# Patient Record
Sex: FEMALE | Race: WHITE | NOT HISPANIC OR LATINO | Employment: OTHER
[De-identification: names, ages, dates, MRNs, and addresses within clinical notes are randomized per-mention and may not be internally consistent; named-entity substitution may affect disease eponyms.]

---

## 2017-09-24 ENCOUNTER — HEALTH MAINTENANCE LETTER (OUTPATIENT)
Age: 62
End: 2017-09-24

## 2018-10-01 ENCOUNTER — HEALTH MAINTENANCE LETTER (OUTPATIENT)
Age: 63
End: 2018-10-01

## 2020-02-23 ENCOUNTER — HEALTH MAINTENANCE LETTER (OUTPATIENT)
Age: 65
End: 2020-02-23

## 2020-12-06 ENCOUNTER — HEALTH MAINTENANCE LETTER (OUTPATIENT)
Age: 65
End: 2020-12-06

## 2021-04-11 ENCOUNTER — HEALTH MAINTENANCE LETTER (OUTPATIENT)
Age: 66
End: 2021-04-11

## 2021-09-26 ENCOUNTER — HEALTH MAINTENANCE LETTER (OUTPATIENT)
Age: 66
End: 2021-09-26

## 2022-03-12 ENCOUNTER — HEALTH MAINTENANCE LETTER (OUTPATIENT)
Age: 67
End: 2022-03-12

## 2022-05-07 ENCOUNTER — HEALTH MAINTENANCE LETTER (OUTPATIENT)
Age: 67
End: 2022-05-07

## 2023-01-08 ENCOUNTER — HEALTH MAINTENANCE LETTER (OUTPATIENT)
Age: 68
End: 2023-01-08

## 2023-06-02 ENCOUNTER — HEALTH MAINTENANCE LETTER (OUTPATIENT)
Age: 68
End: 2023-06-02

## 2024-04-20 ENCOUNTER — THERAPY VISIT (OUTPATIENT)
Dept: PHYSICAL THERAPY | Facility: CLINIC | Age: 69
End: 2024-04-20
Payer: COMMERCIAL

## 2024-04-20 DIAGNOSIS — G89.29 CHRONIC PAIN OF BOTH KNEES: Primary | ICD-10-CM

## 2024-04-20 DIAGNOSIS — M25.561 CHRONIC PAIN OF BOTH KNEES: Primary | ICD-10-CM

## 2024-04-20 DIAGNOSIS — M25.562 CHRONIC PAIN OF BOTH KNEES: Primary | ICD-10-CM

## 2024-04-20 PROCEDURE — 97110 THERAPEUTIC EXERCISES: CPT | Mod: GP | Performed by: PHYSICAL THERAPIST

## 2024-04-20 PROCEDURE — 97161 PT EVAL LOW COMPLEX 20 MIN: CPT | Mod: GP | Performed by: PHYSICAL THERAPIST

## 2024-04-20 ASSESSMENT — ACTIVITIES OF DAILY LIVING (ADL)
PAIN: THE SYMPTOM AFFECTS MY ACTIVITY MODERATELY
GO DOWN STAIRS: ACTIVITY IS SOMEWHAT DIFFICULT
WALK: ACTIVITY IS NOT DIFFICULT
STAND: ACTIVITY IS NOT DIFFICULT
LIMPING: I DO NOT HAVE THE SYMPTOM
AS_A_RESULT_OF_YOUR_KNEE_INJURY,_HOW_WOULD_YOU_RATE_YOUR_CURRENT_LEVEL_OF_DAILY_ACTIVITY?: NEARLY NORMAL
RAW_SCORE: 50
WEAKNESS: I HAVE THE SYMPTOM BUT IT DOES NOT AFFECT MY ACTIVITY
GO UP STAIRS: ACTIVITY IS SOMEWHAT DIFFICULT
SIT WITH YOUR KNEE BENT: ACTIVITY IS SOMEWHAT DIFFICULT
KNEE_ACTIVITY_OF_DAILY_LIVING_SCORE: 71.43
RISE FROM A CHAIR: ACTIVITY IS SOMEWHAT DIFFICULT
SWELLING: I DO NOT HAVE THE SYMPTOM
HOW_WOULD_YOU_RATE_THE_CURRENT_FUNCTION_OF_YOUR_KNEE_DURING_YOUR_USUAL_DAILY_ACTIVITIES_ON_A_SCALE_FROM_0_TO_100_WITH_100_BEING_YOUR_LEVEL_OF_KNEE_FUNCTION_PRIOR_TO_YOUR_INJURY_AND_0_BEING_THE_INABILITY_TO_PERFORM_ANY_OF_YOUR_USUAL_DAILY_ACTIVITIES?: 50
KNEEL ON THE FRONT OF YOUR KNEE: ACTIVITY IS SOMEWHAT DIFFICULT
GIVING WAY, BUCKLING OR SHIFTING OF KNEE: I DO NOT HAVE THE SYMPTOM
HOW_WOULD_YOU_RATE_THE_OVERALL_FUNCTION_OF_YOUR_KNEE_DURING_YOUR_USUAL_DAILY_ACTIVITIES?: NEARLY NORMAL
KNEE_ACTIVITY_OF_DAILY_LIVING_SUM: 50
SQUAT: ACTIVITY IS VERY DIFFICULT
STIFFNESS: THE SYMPTOM AFFECTS MY ACTIVITY SLIGHTLY

## 2024-04-20 NOTE — PROGRESS NOTES
PHYSICAL THERAPY EVALUATION  Type of Visit: Evaluation    See electronic medical record for Abuse and Falls Screening details.    Subjective       Presenting condition or subjective complaint: Bilateral knee pain that has been ongoing for years but worsening the last several months.  Pain is in the kneecap area on both knees and is preventing her from being able to exercise due to pain with squats, lunges, stairs, etc.  Date of onset: 04/19/24 (MD ORDER DATE)    Relevant medical history: Arthritis; Cancer; Migraines or headaches; Osteoarthritis; Radiation treatment; Thyroid problems   Dates & types of surgery: none related to legs    Prior diagnostic imaging/testing results: X-ray     Prior therapy history for the same diagnosis, illness or injury: Yes injection, PT years ago    Prior Level of Function  Transfers: Independent  Ambulation: Independent  ADL: Independent  IADL: Childcare, Driving, Housekeeping, Laundry, Meal preparation, Yard work    Living Environment  Social support:     Type of home: House; 2-story   Stairs to enter the home: Yes   Is there a railing: Yes   Ramp: No   Stairs inside the home: Yes   Is there a railing: Yes   Help at home: None  Equipment owned:       Employment: No    Hobbies/Interests: gardening, knitting, grandkids, hiking, working out    Patient goals for therapy: squats, lunges, stairs without pain    Pain assessment: Pain present     Objective   KNEE EVALUATION  PAIN: Pain Level at Rest: 0/10  Pain Level with Use: 6/10  Pain Location: knee  Pain Quality: Aching and Sharp  Pain Frequency: intermittent  Pain is Worst: daytime  Pain is Exacerbated By: squats, lunges, steps  Pain is Relieved By: rest  Pain Progression: Worsened  INTEGUMENTARY (edema, incisions): WNL  POSTURE:   GAIT:  Weightbearing Status: WBAT  Assistive Device(s): None  Gait Deviations:  diminished proximal control  BALANCE/PROPRIOCEPTION: Single Leg Stance Eyes Open (seconds): Right >10 seconds, Left <8  seconds  WEIGHTBEARING ALIGNMENT:   NON-WEIGHTBEARING ALIGNMENT:   ROM:   (Degrees) Left AROM Left PROM  Right AROM Right PROM   Knee Flexion  140  140   Knee Extension  0  0   Pain:   End feel:     STRENGTH:   Pain: - none + mild ++ moderate +++ severe  Strength Scale: 0-5/5 Left Right   Knee Flexion 5/5 5/5   Knee Extension 5/5 with pain 5/5 with pain   Quad Set Good, crepitus Good, crepitus   Right hip strength:  abduction 4/5, extension 4/5 ER 4+/5  Left hip strength: abduction 4-/5 extension 4-/5  ER 4/5  FLEXIBILITY:   SPECIAL TESTS:    Left Right   Apley's (Meniscus) - -   Lalo's (Meniscus) - -   Bella's (ITB/TFL) - -   Patellar Apprehension Test - -   Patella Tracking Crepitus, lateral tilt Crepitus, lateral tilt   Ligamentous Stability - -   Anterior Drawer (ACL) - -   Posterior Drawer (PCL) - -   Prone Dial Test at 30 Deg and 90 Deg (PCL/PLC) - -   Valgus Stress Testing at 0 Deg and 30 Deg - -   Varus Stress Testing at 0 Deg and 30 Deg  - -     FUNCTIONAL TESTS: Double Leg Squat: pain at 60 degrees bilaterally  Single Leg Squat: Knee valgus, Hip internal rotation, Improper use of glutes/hips, and pain at 30 degrees bilaterally  PALPATION: WNL  JOINT MOBILITY:     Assessment & Plan   CLINICAL IMPRESSIONS  Medical Diagnosis: BILATERAL KNEE PAIN    Treatment Diagnosis: BILATERAL KNEE PAIN   Impression/Assessment: Patient is a 69 year old female with bilateral knee complaints.  The following significant findings have been identified: Pain, Decreased strength, Decreased proprioception, and Decreased activity tolerance. These impairments interfere with their ability to perform self care tasks, recreational activities, household chores, household mobility, and community mobility as compared to previous level of function.     Clinical Decision Making (Complexity):  Clinical Presentation: Stable/Uncomplicated  Clinical Presentation Rationale: based on medical and personal factors listed in PT  evaluation  Clinical Decision Making (Complexity): Low complexity    PLAN OF CARE  Treatment Interventions:  Modalities: Cryotherapy  Interventions: Manual Therapy, Neuromuscular Re-education, Therapeutic Activity, Therapeutic Exercise, Self-Care/Home Management    Long Term Goals     PT Goal 1  Goal Identifier: Stairs  Goal Description: Patient will be able to go up and down 15 steps without knee pain.  Rationale: to maximize safety and independence with performance of ADLs and functional tasks;to maximize safety and independence within the home;to maximize safety and independence within the community;to maximize safety and independence with self cares  Goal Progress: 5/10 or more pain going up/down steps  Target Date: 07/13/24  PT Goal 2  Goal Identifier: Squatting  Goal Description: Patient will be able to squat to 90 degrees without knee pain  Rationale: to maximize safety and independence with performance of ADLs and functional tasks;to maximize safety and independence within the home;to maximize safety and independence within the community;to maximize safety and independence with self cares  Goal Progress: pain squatting to 60 degrees  Target Date: 07/13/24      Frequency of Treatment: 1x/week  Duration of Treatment: 12 weeks    Recommended Referrals to Other Professionals:  none  Education Assessment:   Learner/Method: Patient;Pictures/Video    Risks and benefits of evaluation/treatment have been explained.   Patient/Family/caregiver agrees with Plan of Care.     Evaluation Time:     PT Eval, Low Complexity Minutes (30193): 15       Signing Clinician: HEMAL Davis [facility]                                                                                   OUTPATIENT PHYSICAL THERAPY      PLAN OF TREATMENT FOR OUTPATIENT REHABILITATION   Patient's Last Name, First Name, Joanna Vizcarra YOB: 1955   Provider's Name   [facility]   Medical Record No.  [id number]     Onset Date: 04/19/24 (MD ORDER DATE)  Start of Care Date: 04/20/24     Medical Diagnosis:  BILATERAL KNEE PAIN      PT Treatment Diagnosis:  BILATERAL KNEE PAIN Plan of Treatment  Frequency/Duration: 1x/week/ 12 weeks    Certification date from 04/20/24 to 07/13/24         See note for plan of treatment details and functional goals     Joe Morin, PT                         I CERTIFY THE NEED FOR THESE SERVICES FURNISHED UNDER        THIS PLAN OF TREATMENT AND WHILE UNDER MY CARE .             Physician Signature               Date    X_____________________________________________________                  Referring Provider:  Jatinder Paige    Initial Assessment  See Epic Evaluation- Start of Care Date: 04/20/24

## 2024-04-22 ENCOUNTER — TRANSCRIBE ORDERS (OUTPATIENT)
Dept: OTHER | Age: 69
End: 2024-04-22

## 2024-05-13 ENCOUNTER — THERAPY VISIT (OUTPATIENT)
Dept: PHYSICAL THERAPY | Facility: CLINIC | Age: 69
End: 2024-05-13
Payer: COMMERCIAL

## 2024-05-13 DIAGNOSIS — M25.562 CHRONIC PAIN OF BOTH KNEES: Primary | ICD-10-CM

## 2024-05-13 DIAGNOSIS — M25.561 CHRONIC PAIN OF BOTH KNEES: Primary | ICD-10-CM

## 2024-05-13 DIAGNOSIS — G89.29 CHRONIC PAIN OF BOTH KNEES: Primary | ICD-10-CM

## 2024-05-13 PROCEDURE — 97110 THERAPEUTIC EXERCISES: CPT | Mod: GP | Performed by: PHYSICAL THERAPIST

## 2024-05-29 ENCOUNTER — THERAPY VISIT (OUTPATIENT)
Dept: PHYSICAL THERAPY | Facility: CLINIC | Age: 69
End: 2024-05-29
Payer: COMMERCIAL

## 2024-05-29 DIAGNOSIS — M25.562 CHRONIC PAIN OF BOTH KNEES: Primary | ICD-10-CM

## 2024-05-29 DIAGNOSIS — G89.29 CHRONIC PAIN OF BOTH KNEES: Primary | ICD-10-CM

## 2024-05-29 DIAGNOSIS — M25.561 CHRONIC PAIN OF BOTH KNEES: Primary | ICD-10-CM

## 2024-05-29 PROCEDURE — 97110 THERAPEUTIC EXERCISES: CPT | Mod: GP | Performed by: PHYSICAL THERAPIST

## 2024-06-03 ENCOUNTER — TRANSCRIBE ORDERS (OUTPATIENT)
Dept: OTHER | Age: 69
End: 2024-06-03

## 2024-06-03 DIAGNOSIS — H53.40 VISUAL FIELD DEFECTS: ICD-10-CM

## 2024-06-03 DIAGNOSIS — Z79.899 HIGH RISK MEDICATION USE: Primary | ICD-10-CM

## 2024-06-07 ENCOUNTER — TELEPHONE (OUTPATIENT)
Dept: OPHTHALMOLOGY | Facility: CLINIC | Age: 69
End: 2024-06-07
Payer: COMMERCIAL

## 2024-06-07 NOTE — TELEPHONE ENCOUNTER
[facility] Call Center    Phone Message    May a detailed message be left on voicemail: yes     Reason for Call: Appointment Intake    Referring Provider Name: Dr. Michel   Diagnosis and/or Symptoms: Pt is taking Exemestane, has normal looking OCT and retina (no crystalline and maculopathy) but has a repeatable central visual field defect in the left eye and a new central VF defect in the right eye. Looking for opinion and possible ERG.     Patient is wanting to schedule an ERG per the recommendation from her doctor.  Patient is asking for a call back to schedule at [phone].  Thank you!     Action Taken: Message routed to:  [facility]     Travel Screening: Not Applicable     Date of Service:

## 2024-06-11 ENCOUNTER — THERAPY VISIT (OUTPATIENT)
Dept: PHYSICAL THERAPY | Facility: CLINIC | Age: 69
End: 2024-06-11
Payer: COMMERCIAL

## 2024-06-11 DIAGNOSIS — M25.562 CHRONIC PAIN OF BOTH KNEES: Primary | ICD-10-CM

## 2024-06-11 DIAGNOSIS — G89.29 CHRONIC PAIN OF BOTH KNEES: Primary | ICD-10-CM

## 2024-06-11 DIAGNOSIS — M25.561 CHRONIC PAIN OF BOTH KNEES: Primary | ICD-10-CM

## 2024-06-11 PROCEDURE — 97110 THERAPEUTIC EXERCISES: CPT | Mod: GP | Performed by: PHYSICAL THERAPIST

## 2024-06-17 ENCOUNTER — TELEPHONE (OUTPATIENT)
Dept: OPHTHALMOLOGY | Facility: CLINIC | Age: 69
End: 2024-06-17
Payer: COMMERCIAL

## 2024-06-17 NOTE — TELEPHONE ENCOUNTER
[facility] Call Center    Phone Message    May a detailed message be left on voicemail: yes     Reason for Call: Appointment Intake    Referring Provider Name: Huyen Hilton OD  Diagnosis and/or Symptoms: ERG    Pt is following up on the request for an appointment. States she was supposed to be scheduled for an ERG but hasn't heard form clinic yet.    Writer unable to schedule ERG visits. Please reach out to pt with scheduling options.    Thank You!    Action Taken: Message routed to:  [facility]    Travel Screening: Not Applicable     Date of Service:

## 2024-06-26 NOTE — TELEPHONE ENCOUNTER
Pt is taking Exemestane, has normal looking OCT and retina (no crystalline and maculopathy) but has a repeatable central visual field defect in the left eye and a new central VF defect in the right eye. Looking for opinion and possible ERG       --    Above per referral note    Pt calling [facility] today to review scheduling    Spoke to pt and review referral above    Scheduled with Dr. Sylvain Huffman on July 25 for consult-- pt aware if ERG ordered at appointment, it would likely be another day for specialty testing.    Pt aware of date/time/location/duration/hospital based clinic/main clinic number/hospital based clinic/parking/ambassadors.    Murray Simons RN 10:32 AM 06/26/24

## 2024-06-27 ENCOUNTER — THERAPY VISIT (OUTPATIENT)
Dept: PHYSICAL THERAPY | Facility: CLINIC | Age: 69
End: 2024-06-27
Payer: COMMERCIAL

## 2024-06-27 DIAGNOSIS — M25.562 CHRONIC PAIN OF BOTH KNEES: Primary | ICD-10-CM

## 2024-06-27 DIAGNOSIS — G89.29 CHRONIC PAIN OF BOTH KNEES: Primary | ICD-10-CM

## 2024-06-27 DIAGNOSIS — M25.561 CHRONIC PAIN OF BOTH KNEES: Primary | ICD-10-CM

## 2024-06-27 PROCEDURE — 97110 THERAPEUTIC EXERCISES: CPT | Mod: GP | Performed by: PHYSICAL THERAPIST

## 2024-06-27 NOTE — PROGRESS NOTES
06/27/24 0500   Appointment Info   Signing clinician's name / credentials Preston Asuma, DPT, SCS   Total/Authorized Visits 12   Visits Used 5   Medical Diagnosis BILATERAL KNEE PAIN   PT Tx Diagnosis BILATERAL KNEE PAIN   Quick Adds Certification   Progress Note/Certification   Start of Care Date 04/20/24   Onset of illness/injury or Date of Surgery 04/19/24  (MD ORDER DATE)   Therapy Frequency 1x/week   Predicted Duration 12 weeks   Certification date from 04/20/24   Certification date to 07/13/24   Progress Note Due Date 06/15/24   GOALS   PT Goals 2   PT Goal 1   Goal Identifier Stairs   Goal Description Patient will be able to go up and down 15 steps without knee pain.   Rationale to maximize safety and independence with performance of ADLs and functional tasks;to maximize safety and independence within the home;to maximize safety and independence within the community;to maximize safety and independence with self cares   Goal Progress 0-1/10 pain on step   Target Date 07/13/24   Date Met 06/27/24   PT Goal 2   Goal Identifier Squatting   Goal Description Patient will be able to squat to 90 degrees without knee pain   Rationale to maximize safety and independence with performance of ADLs and functional tasks;to maximize safety and independence within the home;to maximize safety and independence within the community;to maximize safety and independence with self cares   Goal Progress can squat to 90 degrees with 2-3/10 pain   Target Date 07/13/24   Subjective Report   Subjective Report Joanna Sharma reports her knees feel better on stairs and she can squat down more with less pain.  The progress  has been gradual but it is noticeable.  Her biggest complaint is still getting up from the floor as this is awkward and painful.   Objective Measures   Objective Measures Objective Measure 1  (IE)   Objective Measure 1   Objective Measure Squatting   Details bilateral knee pain 2-3/10 squatting to 16 inch box without tape,  "0-1/10 pain with tape   Treatment Interventions (PT)   Interventions Therapeutic Procedure/Exercise   Therapeutic Procedure/Exercise   Therapeutic Procedures: strength, endurance, ROM, flexibility minutes (21394) 35   Ther Proc 2 retro walk on treadmill   Ther Proc 2 - Details x3 min 12% incline   Ther Proc 3 isometric lunge   Ther Proc 3 - Details 5\" hold x5 B   Ther Proc 4 single leg stance with oppo arm row   Ther Proc 4 - Details green band 30\" x3 B   Ther Proc 5 16\" box squats   Ther Proc 5 - Details 2x10   Ther Proc 8 Farris taping   Ther Proc 8 - Details lateral to medial tilt and glide B x5 min   Education   Learner/Method Patient;Pictures/Video   Plan   Home program PTRX   Plan for next session split lunge/squat?   Total Session Time   Timed Code Treatment Minutes 35   Total Treatment Time (sum of timed and untimed services) 35         PLAN  Continue therapy per current plan of care.    Beginning/End Dates of Progress Note Reporting Period:   04/20/2024 to 06/27/2024    Referring Provider:  Jatinder Paige    "

## 2024-07-10 DIAGNOSIS — H43.393 VITREOUS SYNERESIS OF BOTH EYES: Primary | ICD-10-CM

## 2024-07-22 ENCOUNTER — THERAPY VISIT (OUTPATIENT)
Dept: PHYSICAL THERAPY | Facility: CLINIC | Age: 69
End: 2024-07-22
Payer: COMMERCIAL

## 2024-07-22 DIAGNOSIS — M25.562 CHRONIC PAIN OF BOTH KNEES: Primary | ICD-10-CM

## 2024-07-22 DIAGNOSIS — M25.561 CHRONIC PAIN OF BOTH KNEES: Primary | ICD-10-CM

## 2024-07-22 DIAGNOSIS — G89.29 CHRONIC PAIN OF BOTH KNEES: Primary | ICD-10-CM

## 2024-07-22 PROCEDURE — 97110 THERAPEUTIC EXERCISES: CPT | Mod: GP | Performed by: PHYSICAL THERAPIST

## 2024-07-22 ASSESSMENT — ACTIVITIES OF DAILY LIVING (ADL)
RISE FROM A CHAIR: ACTIVITY IS MINIMALLY DIFFICULT
SIT WITH YOUR KNEE BENT: ACTIVITY IS NOT DIFFICULT
KNEE_ACTIVITY_OF_DAILY_LIVING_SUM: 60
AS_A_RESULT_OF_YOUR_KNEE_INJURY,_HOW_WOULD_YOU_RATE_YOUR_CURRENT_LEVEL_OF_DAILY_ACTIVITY?: NEARLY NORMAL
HOW_WOULD_YOU_RATE_THE_OVERALL_FUNCTION_OF_YOUR_KNEE_DURING_YOUR_USUAL_DAILY_ACTIVITIES?: NEARLY NORMAL
RAW_SCORE: 60
STAND: ACTIVITY IS NOT DIFFICULT
GO UP STAIRS: ACTIVITY IS MINIMALLY DIFFICULT
KNEEL ON THE FRONT OF YOUR KNEE: ACTIVITY IS MINIMALLY DIFFICULT
KNEE_ACTIVITY_OF_DAILY_LIVING_SCORE: 85.71
LIMPING: I DO NOT HAVE THE SYMPTOM
WALK: ACTIVITY IS NOT DIFFICULT
PAIN: THE SYMPTOM AFFECTS MY ACTIVITY SLIGHTLY
GO DOWN STAIRS: ACTIVITY IS MINIMALLY DIFFICULT
GIVING WAY, BUCKLING OR SHIFTING OF KNEE: I DO NOT HAVE THE SYMPTOM
SWELLING: I DO NOT HAVE THE SYMPTOM
STIFFNESS: I HAVE THE SYMPTOM BUT IT DOES NOT AFFECT MY ACTIVITY
SQUAT: ACTIVITY IS SOMEWHAT DIFFICULT
WEAKNESS: I HAVE THE SYMPTOM BUT IT DOES NOT AFFECT MY ACTIVITY
HOW_WOULD_YOU_RATE_THE_CURRENT_FUNCTION_OF_YOUR_KNEE_DURING_YOUR_USUAL_DAILY_ACTIVITIES_ON_A_SCALE_FROM_0_TO_100_WITH_100_BEING_YOUR_LEVEL_OF_KNEE_FUNCTION_PRIOR_TO_YOUR_INJURY_AND_0_BEING_THE_INABILITY_TO_PERFORM_ANY_OF_YOUR_USUAL_DAILY_ACTIVITIES?: 80

## 2024-07-25 ENCOUNTER — OFFICE VISIT (OUTPATIENT)
Dept: OPHTHALMOLOGY | Facility: CLINIC | Age: 69
End: 2024-07-25
Payer: COMMERCIAL

## 2024-07-25 DIAGNOSIS — H35.041 RETINAL MICRO-ANEURYSM OF RIGHT EYE: ICD-10-CM

## 2024-07-25 DIAGNOSIS — H43.393 VITREOUS SYNERESIS OF BOTH EYES: ICD-10-CM

## 2024-07-25 DIAGNOSIS — H53.40 VISUAL FIELD DEFECTS: Primary | ICD-10-CM

## 2024-07-25 PROCEDURE — 99204 OFFICE O/P NEW MOD 45 MIN: CPT

## 2024-07-25 PROCEDURE — 92235 FLUORESCEIN ANGRPH MLTIFRAME: CPT

## 2024-07-25 PROCEDURE — 92134 CPTRZ OPH DX IMG PST SGM RTA: CPT

## 2024-07-25 PROCEDURE — G0463 HOSPITAL OUTPT CLINIC VISIT: HCPCS | Mod: 25

## 2024-07-25 RX ORDER — LEVOTHYROXINE SODIUM 112 MCG
TABLET ORAL
COMMUNITY
Start: 2024-01-14

## 2024-07-25 RX ORDER — ROSUVASTATIN CALCIUM 10 MG/1
10 TABLET, COATED ORAL AT BEDTIME
COMMUNITY

## 2024-07-25 RX ORDER — CYCLOBENZAPRINE HCL 5 MG
TABLET ORAL
COMMUNITY

## 2024-07-25 RX ORDER — EXEMESTANE 25 MG/1
1 TABLET ORAL
COMMUNITY
Start: 2024-06-11

## 2024-07-25 ASSESSMENT — REFRACTION_WEARINGRX
OS_AXIS: 001
OS_SPHERE: -1.00
OD_SPHERE: -1.75
OS_ADD: +2.50
OS_CYLINDER: +0.50
OD_ADD: +2.50
OD_CYLINDER: +0.50
OD_AXIS: 003

## 2024-07-25 ASSESSMENT — TONOMETRY
IOP_METHOD: TONOPEN
OD_IOP_MMHG: 19
OS_IOP_MMHG: 19

## 2024-07-25 ASSESSMENT — VISUAL ACUITY
METHOD: SNELLEN - LINEAR
OD_CC: 20/25
OS_CC: 20/20
CORRECTION_TYPE: GLASSES
OD_CC+: +2

## 2024-07-25 ASSESSMENT — SLIT LAMP EXAM - LIDS
COMMENTS: NORMAL
COMMENTS: NORMAL

## 2024-07-25 ASSESSMENT — CONF VISUAL FIELD
OD_INFERIOR_TEMPORAL_RESTRICTION: 0
OS_NORMAL: 1
OS_SUPERIOR_TEMPORAL_RESTRICTION: 0
OS_INFERIOR_NASAL_RESTRICTION: 0
OD_SUPERIOR_NASAL_RESTRICTION: 0
OS_INFERIOR_TEMPORAL_RESTRICTION: 0
OS_SUPERIOR_NASAL_RESTRICTION: 0
METHOD: COUNTING FINGERS
OD_INFERIOR_NASAL_RESTRICTION: 0
OD_SUPERIOR_TEMPORAL_RESTRICTION: 0
OD_NORMAL: 1

## 2024-07-25 ASSESSMENT — EXTERNAL EXAM - RIGHT EYE: OD_EXAM: NORMAL

## 2024-07-25 ASSESSMENT — EXTERNAL EXAM - LEFT EYE: OS_EXAM: NORMAL

## 2024-07-25 NOTE — PROGRESS NOTES
CC: retinopathy     HPI: some blurred vision OU     PMHx:   Breast Ca x2 s/p mastectomy and radiation currently on AI (previously treated with Tamoxifen)  DL     Imaging:  OCT: 07/25/2024  Right eye: Good foveal contour, no IRF/SRF, mild exudation temp to fovea  Left eye: Good foveal contour, no IRF/SRF       VF 24-3: 07/25/2024  OD: no jodi VF defects  OS: no jodi VF defects      FA: 07/25/2024  OD: good transit time, hyperfluorescent dot temp to fovea that increases fluorescence with time, no expansion or leakage , no leakage at the vessels  OS: good transit time, no leakage at macula or major vessels or nerve    Retina Laser procedures:  none    Intravitreal injections:  none    Assessment/ Plan: 07/25/2024       # High risk medication (AI)  # Retinal microaneurysm OD  The patient AI (exemestane 4 years, 25mg daily)  Small microaneurysm temporal to fovea with mild exudation   No evidence or artery or vein occlusion on FA   Will continue to observe   Follow-up in 6 months repeat OCT macula OU, OCT RNFL OU, VF 10-2 OU and clarus pics OU      Avril Huffman MD     Medical Retina  [facility]     Attending Physician Attestation:  Complete documentation of historical and exam elements from today's encounter can be found in the full encounter summary report (not reduplicated in this progress note).  I personally obtained the chief complaint(s) and history of present illness.  I confirmed and edited as necessary the review of systems, past medical/surgical history, family history, social history, and examination findings as documented by others; and I examined the patient myself.  I personally reviewed the relevant tests, images, and reports as documented above.  I formulated and edited as necessary the assessment and plan and discussed the findings and management plan with the patient and family. Avril Huffman MD

## 2024-07-25 NOTE — NURSING NOTE
Chief Complaints and History of Present Illnesses   Patient presents with    COMPREHENSIVE EYE EXAM     VF defects in each eye      Chief Complaint(s) and History of Present Illness(es)       COMPREHENSIVE EYE EXAM              Associated symptoms: dryness (more in the winter) and floaters.  Negative for flashes    Treatments tried: artificial tears    Comments: VF defects in each eye               Comments    Pt takes Exemestane. She is here for a retina eval due to repeatable VF defect in the left eye and a new VF defect in the right eye.   Her vision seems like it's not sharp, and she's wondering if it's due to having a cataract. She takes Refresh for dry eyes, more in the winter.     Suzanne Evans OA 1:16 PM July 25, 2024

## 2024-09-03 ENCOUNTER — THERAPY VISIT (OUTPATIENT)
Dept: PHYSICAL THERAPY | Facility: CLINIC | Age: 69
End: 2024-09-03
Payer: COMMERCIAL

## 2024-09-03 DIAGNOSIS — G89.29 CHRONIC PAIN OF BOTH KNEES: Primary | ICD-10-CM

## 2024-09-03 DIAGNOSIS — M25.561 CHRONIC PAIN OF BOTH KNEES: Primary | ICD-10-CM

## 2024-09-03 DIAGNOSIS — M25.562 CHRONIC PAIN OF BOTH KNEES: Primary | ICD-10-CM

## 2024-09-03 PROCEDURE — 97110 THERAPEUTIC EXERCISES: CPT | Mod: GP | Performed by: PHYSICAL THERAPIST

## 2024-10-31 ENCOUNTER — THERAPY VISIT (OUTPATIENT)
Dept: PHYSICAL THERAPY | Facility: CLINIC | Age: 69
End: 2024-10-31
Payer: COMMERCIAL

## 2024-10-31 DIAGNOSIS — M25.562 CHRONIC PAIN OF BOTH KNEES: Primary | ICD-10-CM

## 2024-10-31 DIAGNOSIS — M25.561 CHRONIC PAIN OF BOTH KNEES: Primary | ICD-10-CM

## 2024-10-31 DIAGNOSIS — G89.29 CHRONIC PAIN OF BOTH KNEES: Primary | ICD-10-CM

## 2024-10-31 PROCEDURE — 97110 THERAPEUTIC EXERCISES: CPT | Mod: GP | Performed by: PHYSICAL THERAPIST

## 2024-10-31 NOTE — PROGRESS NOTES
[facility]                                                                                   OUTPATIENT PHYSICAL THERAPY    PLAN OF TREATMENT FOR OUTPATIENT REHABILITATION   Patient's Last Name, First Name, Joanna Vizcarra YOB: 1955   Provider's Name   [facility]   Medical Record No.  [id number]     Onset Date: 04/19/24 (MD ORDER DATE)  Start of Care Date: 04/20/24     Medical Diagnosis:  BILATERAL KNEE PAIN      PT Treatment Diagnosis:  BILATERAL KNEE PAIN Plan of Treatment  Frequency/Duration: 1x/month/ 3 months    Certification date from 10/15/24 to 01/07/25         See note for plan of treatment details and functional goals     Joe Morin, PT                         I CERTIFY THE NEED FOR THESE SERVICES FURNISHED UNDER        THIS PLAN OF TREATMENT AND WHILE UNDER MY CARE .             Physician Signature               Date    X_____________________________________________________                  Referring Provider:  Jatinder Paige    Initial Assessment  See Epic Evaluation- Start of Care Date: 04/20/24         10/31/24 0500   Appointment Info   Signing clinician's name / credentials Preston Patience, DPT, SCS   Total/Authorized Visits 12   Visits Used 8   Medical Diagnosis BILATERAL KNEE PAIN   PT Tx Diagnosis BILATERAL KNEE PAIN   Quick Adds Certification   Progress Note/Certification   Start of Care Date 04/20/24   Onset of illness/injury or Date of Surgery 04/19/24  (MD ORDER DATE)   Therapy Frequency 1x/month   Predicted Duration 3 months   Certification date from 10/15/24   Certification date to 01/07/25   Progress Note Due Date 12/26/24   Progress Note Completed Date 10/31/24   GOALS   PT Goals 3   PT Goal 1   Goal Identifier Stairs   Goal Description Patient will be able to go up and down 15 steps without knee pain.   Rationale to maximize safety and independence with performance of ADLs and functional tasks;to maximize  safety and independence within the home;to maximize safety and independence within the community;to maximize safety and independence with self cares   Goal Progress 0/10 pain on stairs   Target Date 07/13/24   Date Met 06/27/24   PT Goal 2   Goal Identifier Squatting   Goal Description Patient will be able to squat to full depth without knee pain   Rationale to maximize safety and independence with performance of ADLs and functional tasks;to maximize safety and independence within the home;to maximize safety and independence within the community;to maximize safety and independence with self cares   Goal Progress can squat to 90 with no pain, still 1-3/10 pain squatting deeper   Target Date 01/07/25   PT Goal 3   Goal Identifier Floor transfer   Goal Description Patient will be able to perform a floor to stand transfer with 0/10 knee pain.   Rationale to maximize safety and independence with performance of ADLs and functional tasks;to maximize safety and independence within the home;to maximize safety and independence within the community;to maximize safety and independence with self cares   Goal Progress 1-2/10 knee pain with floor transfer   Target Date 01/07/25   Subjective Report   Subjective Report Joanna Sharma reports her knees are definitely stronger and less painful than they were last spring. She no longer needs to tape and has no pain on stairs.  She still has pain with floor transfers and with full depth squatting.   Objective Measure 1   Objective Measure Squatting   Details no pain squatting to 90 degrees, 2-3/10 pain squatting to full depth   Objective Measure 2   Objective Measure Functional mobilty   Details Floor to stand transfer with 1-2/10 knee pain   Treatment Interventions (PT)   Interventions Therapeutic Procedure/Exercise   Therapeutic Procedure/Exercise   Therapeutic Procedures: strength, endurance, ROM, flexibility minutes (81247) 30   Ther Proc 1 bike   Ther Proc 1 - Details x5 min   Ther Proc  2 Macanese squats   Ther Proc 2 - Details black band x10 with each leg   Ther Proc 3 Luxembourgish split squat   Ther Proc 3 - Details 2x8 B   Ther Proc 4 attempted heel elevated squats   Ther Proc 4 - Details x10, no change in pain   Ther Proc 5 10 min spent discussing HEP plan and progression ideas   Skilled Intervention Exercise modification, stretching instruction   Patient Response/Progress decreased pain with squatting   Education   Learner/Method Patient;Pictures/Video   Plan   Home program PTRX   Plan for next session check on squat depth   Total Session Time   Timed Code Treatment Minutes 30   Total Treatment Time (sum of timed and untimed services) 30           PLAN  Continue therapy per current plan of care.    Beginning/End Dates of Progress Note Reporting Period:  07/22/24 to 10/31/2024    Referring Provider:  Jatinder Paige

## 2025-02-06 ENCOUNTER — OFFICE VISIT (OUTPATIENT)
Dept: OPHTHALMOLOGY | Facility: CLINIC | Age: 70
End: 2025-02-06
Payer: COMMERCIAL

## 2025-02-06 DIAGNOSIS — H53.40 VISUAL FIELD DEFECTS: ICD-10-CM

## 2025-02-06 DIAGNOSIS — H43.393 VITREOUS SYNERESIS OF BOTH EYES: Primary | ICD-10-CM

## 2025-02-06 DIAGNOSIS — H35.041 RETINAL MICRO-ANEURYSM OF RIGHT EYE: ICD-10-CM

## 2025-02-06 PROCEDURE — 92082 INTERMEDIATE VISUAL FIELD XM: CPT

## 2025-02-06 PROCEDURE — 92134 CPTRZ OPH DX IMG PST SGM RTA: CPT

## 2025-02-06 PROCEDURE — 92133 CPTRZD OPH DX IMG PST SGM ON: CPT

## 2025-02-06 PROCEDURE — 92250 FUNDUS PHOTOGRAPHY W/I&R: CPT

## 2025-02-06 PROCEDURE — G0463 HOSPITAL OUTPT CLINIC VISIT: HCPCS

## 2025-02-06 ASSESSMENT — REFRACTION_WEARINGRX
OS_CYLINDER: +0.50
OS_ADD: +2.50
OS_SPHERE: -1.00
OD_AXIS: 003
OD_ADD: +2.50
OD_CYLINDER: +0.50
OS_AXIS: 001
OD_SPHERE: -1.75

## 2025-02-06 ASSESSMENT — SLIT LAMP EXAM - LIDS
COMMENTS: NORMAL
COMMENTS: NORMAL

## 2025-02-06 ASSESSMENT — CONF VISUAL FIELD: COMMENTS: VF TODAY

## 2025-02-06 ASSESSMENT — TONOMETRY
IOP_METHOD: TONOPEN
OS_IOP_MMHG: 20
OD_IOP_MMHG: 16

## 2025-02-06 ASSESSMENT — EXTERNAL EXAM - LEFT EYE: OS_EXAM: NORMAL

## 2025-02-06 ASSESSMENT — VISUAL ACUITY
CORRECTION_TYPE: GLASSES
METHOD: SNELLEN - LINEAR
OD_CC: 20/25
OS_CC: 20/20

## 2025-02-06 ASSESSMENT — EXTERNAL EXAM - RIGHT EYE: OD_EXAM: NORMAL

## 2025-02-06 NOTE — PROGRESS NOTES
CC: retinopathy     HPI: some blurred vision OU     PMHx:   Breast Ca x2 s/p mastectomy and radiation currently on AI (previously treated with Tamoxifen)  DL     Imaging:  OCT: 02/06/2025  Right eye: Good foveal contour, no IRF/SRF, mild exudation temp to fovea-resolved  Left eye: Good foveal contour, no IRF/SRF       VF 24-3: 02/06/2025  OD: no jodi VF defects  OS: no jodi VF defects      FA:  OD: good transit time, hyperfluorescent dot temp to fovea that increases fluorescence with time, no expansion or leakage , no leakage at the vessels  OS: good transit time, no leakage at macula or major vessels or nerve    Retina Laser procedures:  none    Intravitreal injections:  none    Assessment/ Plan: 02/06/2025       # High risk medication (AI)  # Retinal microaneurysm OD  The patient AI (exemestane 4 years, 25mg daily)  Small microaneurysm temporal to fovea with resolved  exudation   OCT macula OU, OCT RNFL OU, VF 10-2 OU and clarus pics OU are all stable  Will continue to observe   Follow-up in 7 months repeat OCT macula and RNFL    Avril Huffman MD     Medical Retina  [facility]     Attending Physician Attestation:  Complete documentation of historical and exam elements from today's encounter can be found in the full encounter summary report (not reduplicated in this progress note).  I personally obtained the chief complaint(s) and history of present illness.  I confirmed and edited as necessary the review of systems, past medical/surgical history, family history, social history, and examination findings as documented by others; and I examined the patient myself.  I personally reviewed the relevant tests, images, and reports as documented above.  I formulated and edited as necessary the assessment and plan and discussed the findings and management plan with the patient and family. Avril Huffman MD

## 2025-02-06 NOTE — NURSING NOTE
Chief Complaints and History of Present Illnesses   Patient presents with    Follow Up     retinopathy      Chief Complaint(s) and History of Present Illness(es)       Follow Up              Comments: retinopathy               Comments    Pt states no change in VA since last visit  Floaters same as before   No flashes, eye pain or redness    Scarlett Chi COT 1:16 PM February 6, 2025

## 2025-02-20 ENCOUNTER — THERAPY VISIT (OUTPATIENT)
Dept: PHYSICAL THERAPY | Facility: CLINIC | Age: 70
End: 2025-02-20
Payer: COMMERCIAL

## 2025-02-20 DIAGNOSIS — M25.561 CHRONIC PAIN OF BOTH KNEES: Primary | ICD-10-CM

## 2025-02-20 DIAGNOSIS — G89.29 CHRONIC PAIN OF BOTH KNEES: Primary | ICD-10-CM

## 2025-02-20 DIAGNOSIS — M25.562 CHRONIC PAIN OF BOTH KNEES: Primary | ICD-10-CM

## 2025-02-20 ASSESSMENT — ACTIVITIES OF DAILY LIVING (ADL): KNEE_ACTIVITY_OF_DAILY_LIVING_SCORE: 80

## 2025-02-20 NOTE — PROGRESS NOTES
[facility]                                                                                   OUTPATIENT PHYSICAL THERAPY    PLAN OF TREATMENT FOR OUTPATIENT REHABILITATION   Patient's Last Name, First Name, Joanna Vizcarra YOB: 1955   Provider's Name   [facility]   Medical Record No.  [id number]     Onset Date: 04/19/24 (MD ORDER DATE)  Start of Care Date: 04/20/24     Medical Diagnosis:  BILATERAL KNEE PAIN      PT Treatment Diagnosis:  BILATERAL KNEE PAIN Plan of Treatment  Frequency/Duration: 1x/month/ 3 months    Certification date from 01/08/25 to 04/02/25         See note for plan of treatment details and functional goals     Joe Morin, PT                         I CERTIFY THE NEED FOR THESE SERVICES FURNISHED UNDER        THIS PLAN OF TREATMENT AND WHILE UNDER MY CARE .             Physician Signature               Date    X_____________________________________________________                  Referring Provider:  Jatinder Paige    Initial Assessment  See Epic Evaluation- Start of Care Date: 04/20/24 02/20/25 0500   Appointment Info   Signing clinician's name / credentials Preston Patience, DPT, SCS   Total/Authorized Visits 12   Visits Used 9   Medical Diagnosis BILATERAL KNEE PAIN   PT Tx Diagnosis BILATERAL KNEE PAIN   Quick Adds Certification   Progress Note/Certification   Start of Care Date 04/20/24   Onset of illness/injury or Date of Surgery 04/19/24  (MD ORDER DATE)   Therapy Frequency 1x/month   Predicted Duration 3 months   Certification date from 01/08/25   Certification date to 04/02/25   Progress Note Due Date 04/17/25   Progress Note Completed Date 02/20/25   GOALS   PT Goals 3   PT Goal 1   Goal Identifier Stairs   Goal Description Patient will be able to go up and down 15 steps without knee pain.   Rationale to maximize safety and independence with performance of ADLs and functional tasks;to maximize  safety and independence within the home;to maximize safety and independence within the community;to maximize safety and independence with self cares   Goal Progress 0/10 pain on stairs   Target Date 07/13/24   Date Met 06/27/24   PT Goal 2   Goal Identifier Squatting   Goal Description Patient will be able to squat to full depth without knee pain   Rationale to maximize safety and independence with performance of ADLs and functional tasks;to maximize safety and independence within the home;to maximize safety and independence within the community;to maximize safety and independence with self cares   Goal Progress 3/10 pain squatting to <90 degreees   Target Date 04/02/25   PT Goal 3   Goal Identifier Floor transfer   Goal Description Patient will be able to perform a floor to stand transfer with 0/10 knee pain.   Rationale to maximize safety and independence with performance of ADLs and functional tasks;to maximize safety and independence within the home;to maximize safety and independence within the community;to maximize safety and independence with self cares   Goal Progress 3/10 knee pain with floor transfer   Target Date 04/02/25   Subjective Report   Subjective Report Joanna Sharma reports her knees were doing well for several weeks but she started having increased pain again shorlty after increasing her daily walking distance/duration considerably.  She has tried to continue her home strength program but some of these exercises have become painful again.   Objective Measures   Objective Measures Objective Measure 3   Objective Measure 1   Objective Measure Squatting   Details pain in both knees squatting to 70 degrees.  Squatting to equal depth with heels elevated has no effect.  Squatting to same depth after stretching quads has signifiant reduction of pain.   Objective Measure 2   Objective Measure ROM   Details bilateral knee PROM full, limited quad and hip flexor flexibility   Objective Measure 3   Objective  "Measure Ankle mobility   Details bilateral soleus tightness noted   Treatment Interventions (PT)   Interventions Therapeutic Procedure/Exercise   Therapeutic Procedure/Exercise   Therapeutic Procedures: strength, endurance, ROM, flexibility minutes (92597) 22   Ther Proc 1 quad stretch sidelying   Ther Proc 1 - Details 45\" x2 B   Ther Proc 2 soleus stretch 45\" hold x2 B   Ther Proc 3 gastroc stretch   Ther Proc 3 - Details 45\" hold x2   Ther Proc 4 chair squats with and without heels elevated   Ther Proc 4 - Details 2x10   Ther Proc 5 Farris taping   Ther Proc 5 - Details bilateral knee lateral to medial tilt and glide   Ther Proc 6 x8 min   Skilled Intervention Exercise modification, stretching instruction   Patient Response/Progress decreased pain with squatting   Eval/Assessments   Assessments PT Re-Eval   PT Eval, Re-eval Minutes (83886) 18   Education   Learner/Method Patient;Pictures/Video   Plan   Home program PTRX   Plan for next session check on squat depth   Total Session Time   Timed Code Treatment Minutes 22   Total Treatment Time (sum of timed and untimed services) 40           PLAN  Continue therapy per current plan of care.    Beginning/End Dates of Progress Note Reporting Period:  10/31/24 to 02/20/2025    Referring Provider:  Jatinder Paige    "

## 2025-03-08 ENCOUNTER — HEALTH MAINTENANCE LETTER (OUTPATIENT)
Age: 70
End: 2025-03-08

## 2025-08-12 DIAGNOSIS — H53.40 VISUAL FIELD DEFECTS: ICD-10-CM

## 2025-08-12 DIAGNOSIS — H35.041 RETINAL MICRO-ANEURYSM OF RIGHT EYE: Primary | ICD-10-CM

## 2025-08-12 PROBLEM — M25.561 CHRONIC PAIN OF BOTH KNEES: Status: RESOLVED | Noted: 2024-04-20 | Resolved: 2025-08-12

## 2025-08-12 PROBLEM — G89.29 CHRONIC PAIN OF BOTH KNEES: Status: RESOLVED | Noted: 2024-04-20 | Resolved: 2025-08-12

## 2025-08-12 PROBLEM — M25.562 CHRONIC PAIN OF BOTH KNEES: Status: RESOLVED | Noted: 2024-04-20 | Resolved: 2025-08-12

## 2025-08-21 ENCOUNTER — OFFICE VISIT (OUTPATIENT)
Dept: OPHTHALMOLOGY | Facility: CLINIC | Age: 70
End: 2025-08-21
Payer: COMMERCIAL

## 2025-08-21 DIAGNOSIS — H53.40 VISUAL FIELD DEFECTS: ICD-10-CM

## 2025-08-21 DIAGNOSIS — H35.041 RETINAL MICRO-ANEURYSM OF RIGHT EYE: ICD-10-CM

## 2025-08-21 PROCEDURE — 92133 CPTRZD OPH DX IMG PST SGM ON: CPT

## 2025-08-21 PROCEDURE — 92134 CPTRZ OPH DX IMG PST SGM RTA: CPT

## 2025-08-21 ASSESSMENT — REFRACTION_WEARINGRX
OS_AXIS: 001
OD_CYLINDER: +0.50
OD_SPHERE: -1.75
OD_ADD: +2.50
OS_ADD: +2.50
OS_CYLINDER: +0.50
OS_SPHERE: -1.00
OD_AXIS: 003

## 2025-08-21 ASSESSMENT — CONF VISUAL FIELD
OS_INFERIOR_NASAL_RESTRICTION: 0
OD_INFERIOR_TEMPORAL_RESTRICTION: 0
OS_INFERIOR_TEMPORAL_RESTRICTION: 0
OD_INFERIOR_NASAL_RESTRICTION: 0
OD_SUPERIOR_NASAL_RESTRICTION: 0
OS_NORMAL: 1
OS_SUPERIOR_TEMPORAL_RESTRICTION: 0
OS_SUPERIOR_NASAL_RESTRICTION: 0
OD_SUPERIOR_TEMPORAL_RESTRICTION: 0
METHOD: COUNTING FINGERS
OD_NORMAL: 1

## 2025-08-21 ASSESSMENT — TONOMETRY
OD_IOP_MMHG: 12
OS_IOP_MMHG: 18
IOP_METHOD: TONOPEN

## 2025-08-21 ASSESSMENT — SLIT LAMP EXAM - LIDS
COMMENTS: NORMAL
COMMENTS: NORMAL

## 2025-08-21 ASSESSMENT — EXTERNAL EXAM - LEFT EYE: OS_EXAM: NORMAL

## 2025-08-21 ASSESSMENT — VISUAL ACUITY
OD_CC: 20/30
METHOD: SNELLEN - LINEAR
OD_CC+: +1
OS_CC+: -2
OS_CC: 20/25

## 2025-08-21 ASSESSMENT — EXTERNAL EXAM - RIGHT EYE: OD_EXAM: NORMAL
